# Patient Record
Sex: MALE | Race: WHITE | URBAN - METROPOLITAN AREA
[De-identification: names, ages, dates, MRNs, and addresses within clinical notes are randomized per-mention and may not be internally consistent; named-entity substitution may affect disease eponyms.]

---

## 2024-09-16 ENCOUNTER — APPOINTMENT (OUTPATIENT)
Age: 39
End: 2024-09-16

## 2024-09-23 ENCOUNTER — OCCMED (OUTPATIENT)
Age: 39
End: 2024-09-23

## 2024-09-23 NOTE — PROGRESS NOTES
[facility]        NAME: Pablo Fernandes is a 38 y.o. male  : 1985    MRN: [id number]  DATE: 2024  TIME: 7:17 PM    Assessment and Orders   No primary diagnosis found.  No diagnosis found.      Plan and Discussion      Symptoms and exam consistent with ***.     Risks and benefits discussed. Patient understands and agrees with the plan.     PATIENT INSTRUCTIONS    ***      If tests have been performed at [facility], our office will contact you with results if changes need to be made to the care plan discussed with you at the visit.  [facility]    Follow up with PCP.     If any of the following occur, please report to your nearest ED for evaluation or call 911.   Difficultly breathing or shortness of breath  Chest pain  Acutely worsening symptoms.         Chief Complaint   No chief complaint on file.        History of Present Illness       HPI    Review of Systems   Review of Systems      Current Medications     No current outpatient medications on file.    Current Allergies     Allergies as of 2024    (Not on File)            The following portions of the patient's history were reviewed and updated as appropriate: allergies, current medications, past family history, past medical history, past social history, past surgical history and problem list.     No past medical history on file.    No past surgical history on file.    No family history on file.      Medications have been verified.        Objective   There were no vitals taken for this visit.  No LMP for male patient.       Physical Exam     Physical Exam          Chasity Carpio DO

## 2025-06-11 ENCOUNTER — OFFICE VISIT (OUTPATIENT)
Age: 40
End: 2025-06-11
Payer: COMMERCIAL

## 2025-06-11 VITALS
SYSTOLIC BLOOD PRESSURE: 116 MMHG | RESPIRATION RATE: 18 BRPM | WEIGHT: 175.8 LBS | TEMPERATURE: 96 F | DIASTOLIC BLOOD PRESSURE: 58 MMHG | OXYGEN SATURATION: 98 % | HEART RATE: 81 BPM

## 2025-06-11 DIAGNOSIS — L23.7 POISON IVY DERMATITIS: Primary | ICD-10-CM

## 2025-06-11 PROCEDURE — 99213 OFFICE O/P EST LOW 20 MIN: CPT | Performed by: PHYSICIAN ASSISTANT

## 2025-06-11 PROCEDURE — S9088 SERVICES PROVIDED IN URGENT: HCPCS | Performed by: PHYSICIAN ASSISTANT

## 2025-06-11 RX ORDER — PREDNISONE 10 MG/1
TABLET ORAL
Qty: 30 TABLET | Refills: 0 | Status: SHIPPED | OUTPATIENT
Start: 2025-06-11

## 2025-06-11 RX ORDER — DIPHENOXYLATE HYDROCHLORIDE AND ATROPINE SULFATE 2.5; .025 MG/1; MG/1
1 TABLET ORAL DAILY
COMMUNITY

## 2025-06-11 NOTE — PROGRESS NOTES
[facility]        NAME: Pablo Fernandes is a 39 y.o. male  : 1985    MRN: [id number]  DATE: 2025  TIME: 11:27 AM    Assessment and Plan   Poison ivy dermatitis [L23.7]  1. Poison ivy dermatitis  predniSONE 10 mg tablet            Patient Instructions     Prednisone tapering dosing as directed  Discussed possible side effects  Also, Zanfel cream as directed on package insert    Follow up with PCP in 3-5 days.  Proceed to  ER if symptoms worsen.      Chief Complaint     Chief Complaint   Patient presents with    Poison Ivy     Symptoms started yesterday, pt complains of rash on lip, face, and right arm. Pt states he has been around poison ivy recently.         History of Present Illness       39-year-old male  presenting with complaint of pruritic rash developing on the right side of his face near his lips and left distal forearm in the last 24-48 hrs.        Review of Systems   Review of Systems   Constitutional:  Negative for fever.   Respiratory:  Negative for cough.    Gastrointestinal:  Negative for nausea and vomiting.   Skin:  Positive for rash.   Neurological:  Negative for headaches.         Current Medications     Current Medications[1]    Current Allergies     Allergies as of 2025    (No Known Allergies)            The following portions of the patient's history were reviewed and updated as appropriate: allergies, current medications, past family history, past medical history, past social history, past surgical history and problem list.     Past Medical History[2]    Past Surgical History[3]    Family History[4]      Medications have been verified.        Objective   /58 (BP Location: Right arm, Patient Position: Sitting, Cuff Size: Standard)   Pulse 81   Temp (!) 96 °F (35.6 °C) (Tympanic)   Resp 18   Wt 79.7 kg (175 lb 12.8 oz)   SpO2 98%        Physical Exam     Physical Exam  Vitals and nursing note reviewed.   Constitutional:       General: He is  not in acute distress.     Appearance: Normal appearance. He is normal weight.   HENT:      Head:        Comments: 3 linear erythematous papular lesions on the right side of the facial cheek with mild vesicular formation.    Similar presentation of the left distal forearm.    Eyes:      General: No scleral icterus.     Extraocular Movements: Extraocular movements intact.      Pupils: Pupils are equal, round, and reactive to light.       Cardiovascular:      Rate and Rhythm: Normal rate and regular rhythm.      Pulses: Normal pulses.   Pulmonary:      Effort: Pulmonary effort is normal. No respiratory distress.     Musculoskeletal:         General: Normal range of motion.      Cervical back: Normal range of motion.     Neurological:      General: No focal deficit present.      Mental Status: He is alert and oriented to person, place, and time.      Coordination: Coordination normal.      Gait: Gait normal.     Psychiatric:         Mood and Affect: Mood normal.         Behavior: Behavior normal.         Thought Content: Thought content normal.         Judgment: Judgment normal.                        [1]   Current Outpatient Medications:     predniSONE 10 mg tablet, Take 4 pills PO once in the morning for first 3 days.  3 Pills PO for next 3 days, 2 pills PO for next 3 days, 1 pill PO for next 3 days., Disp: 30 tablet, Rfl: 0    multivitamin (THERAGRAN) TABS, Take 1 tablet by mouth daily, Disp: , Rfl:   [2] No past medical history on file.  [3] No past surgical history on file.  [4] No family history on file.

## 2025-06-11 NOTE — PATIENT INSTRUCTIONS
Patient Education     Poison Ivy   What is this product used for?   The use of poison ivy for any health condition is not supported.  What are the precautions when taking this product?   Always check with your doctor before you use a natural product. Some products may not mix well with drugs or other natural products.  Do not swallow this product. Serious side effects, including death, may happen if you are very allergic to poison ivy, cashews, or mangoes.  Do not use this product if you are pregnant or breastfeeding.  What should I watch for?   Rash or blisters  Mouth or throat irritation  When do I need to call the doctor?   Signs of a very bad reaction. These include wheezing; chest tightness; fever; itching; bad cough; blue skin color; seizures; or swelling of face, lips, tongue, or throat. Go to the ER right away.  Very bad throwing up  Very bad loose stools  Last Reviewed Date   2022-04-13  Consumer Information Use and Disclaimer   This generalized information is a limited summary of diagnosis, treatment, and/or medication information. It is not meant to be comprehensive and should be used as a tool to help the user understand and/or assess potential diagnostic and treatment options. It does NOT include all information about conditions, treatments, medications, side effects, or risks that may apply to a specific patient. It is not intended to be medical advice or a substitute for the medical advice, diagnosis, or treatment of a health care provider based on the health care provider's examination and assessment of a patient’s specific and unique circumstances. Patients must speak with a health care provider for complete information about their health, medical questions, and treatment options, including any risks or benefits regarding use of medications. This information does not endorse any treatments or medications as safe, effective, or approved for treating a specific patient. [employer] and its  affiliates disclaim any warranty or liability relating to this information or the use thereof. The use of this information is governed by the Terms of Use, available at https://www.[employer].com/en/know/clinical-effectiveness-terms   Copyright   Copyright © 2024 [employer] and its affiliates and/or licensors. All rights reserved.